# Patient Record
Sex: FEMALE | Race: WHITE | NOT HISPANIC OR LATINO | Employment: PART TIME | URBAN - METROPOLITAN AREA
[De-identification: names, ages, dates, MRNs, and addresses within clinical notes are randomized per-mention and may not be internally consistent; named-entity substitution may affect disease eponyms.]

---

## 2022-01-31 ENCOUNTER — OFFICE VISIT (OUTPATIENT)
Dept: OBSTETRICS AND GYNECOLOGY | Facility: CLINIC | Age: 37
End: 2022-01-31
Payer: MEDICAID

## 2022-01-31 VITALS
HEIGHT: 66 IN | WEIGHT: 172.63 LBS | DIASTOLIC BLOOD PRESSURE: 90 MMHG | SYSTOLIC BLOOD PRESSURE: 182 MMHG | HEART RATE: 78 BPM | BODY MASS INDEX: 27.74 KG/M2

## 2022-01-31 DIAGNOSIS — Z01.419 ENCOUNTER FOR ANNUAL ROUTINE GYNECOLOGICAL EXAMINATION: Primary | ICD-10-CM

## 2022-01-31 DIAGNOSIS — Z80.3 FAMILY HISTORY OF BREAST CANCER IN FIRST DEGREE RELATIVE: ICD-10-CM

## 2022-01-31 DIAGNOSIS — I10 PRIMARY HYPERTENSION: ICD-10-CM

## 2022-01-31 DIAGNOSIS — N83.202 CYST OF LEFT OVARY: ICD-10-CM

## 2022-01-31 DIAGNOSIS — Z11.3 SCREEN FOR STD (SEXUALLY TRANSMITTED DISEASE): ICD-10-CM

## 2022-01-31 PROCEDURE — 3080F DIAST BP >= 90 MM HG: CPT | Mod: CPTII,S$GLB,, | Performed by: NURSE PRACTITIONER

## 2022-01-31 PROCEDURE — 1159F PR MEDICATION LIST DOCUMENTED IN MEDICAL RECORD: ICD-10-PCS | Mod: CPTII,S$GLB,, | Performed by: NURSE PRACTITIONER

## 2022-01-31 PROCEDURE — 3077F PR MOST RECENT SYSTOLIC BLOOD PRESSURE >= 140 MM HG: ICD-10-PCS | Mod: CPTII,S$GLB,, | Performed by: NURSE PRACTITIONER

## 2022-01-31 PROCEDURE — 99385 PR PREVENTIVE VISIT,NEW,18-39: ICD-10-PCS | Mod: S$GLB,,, | Performed by: NURSE PRACTITIONER

## 2022-01-31 PROCEDURE — 3008F BODY MASS INDEX DOCD: CPT | Mod: CPTII,S$GLB,, | Performed by: NURSE PRACTITIONER

## 2022-01-31 PROCEDURE — 99385 PREV VISIT NEW AGE 18-39: CPT | Mod: S$GLB,,, | Performed by: NURSE PRACTITIONER

## 2022-01-31 PROCEDURE — 1160F RVW MEDS BY RX/DR IN RCRD: CPT | Mod: CPTII,S$GLB,, | Performed by: NURSE PRACTITIONER

## 2022-01-31 PROCEDURE — 3080F PR MOST RECENT DIASTOLIC BLOOD PRESSURE >= 90 MM HG: ICD-10-PCS | Mod: CPTII,S$GLB,, | Performed by: NURSE PRACTITIONER

## 2022-01-31 PROCEDURE — 3008F PR BODY MASS INDEX (BMI) DOCUMENTED: ICD-10-PCS | Mod: CPTII,S$GLB,, | Performed by: NURSE PRACTITIONER

## 2022-01-31 PROCEDURE — 1160F PR REVIEW ALL MEDS BY PRESCRIBER/CLIN PHARMACIST DOCUMENTED: ICD-10-PCS | Mod: CPTII,S$GLB,, | Performed by: NURSE PRACTITIONER

## 2022-01-31 PROCEDURE — 3077F SYST BP >= 140 MM HG: CPT | Mod: CPTII,S$GLB,, | Performed by: NURSE PRACTITIONER

## 2022-01-31 PROCEDURE — 1159F MED LIST DOCD IN RCRD: CPT | Mod: CPTII,S$GLB,, | Performed by: NURSE PRACTITIONER

## 2022-01-31 RX ORDER — METOPROLOL TARTRATE 25 MG/1
25 TABLET, FILM COATED ORAL NIGHTLY
Qty: 30 TABLET | Refills: 3 | Status: SHIPPED | OUTPATIENT
Start: 2022-01-31 | End: 2022-05-17

## 2022-01-31 NOTE — PROGRESS NOTES
"  Subjective:       Patient ID: Johanna Hu is a 36 y.o. female.    Chief Complaint:  Well Woman      History of Present Illness  Pt here for gyn annual.  History and past labs reviewed with patient.    Complaints: HX of abnormal PAP in past, HX of pelvic mass that resolved, cycles WNL.  Family HX of breast cancer: mother DX in late 50's and had mastec, MGM with same, also Mat Aunt DX in her 40's       Review of Systems  Review of Systems   Constitutional: Negative for appetite change and fatigue.   HENT: Negative for tinnitus.    Cardiovascular: Negative for chest pain and palpitations.   Gastrointestinal: Negative for abdominal pain, bloating, constipation, vomiting and reflux.   Endocrine: Negative for diabetes, hyperthyroidism and hypothyroidism.   Genitourinary: Negative for flank pain, menstrual problem, pelvic pain and postcoital bleeding.   Musculoskeletal: Negative for back pain and myalgias.   Integumentary:  Negative for rash, mole/lesion, breast mass and breast tenderness.   Neurological: Negative for vertigo, syncope and headaches.   Psychiatric/Behavioral: Negative for depression and sleep disturbance. The patient is not nervous/anxious.    Breast: Negative for lump, mass, mastodynia and tenderness          Objective:     Vitals:    01/31/22 1052   BP: (!) 182/90   Pulse: 78   Weight: 78.3 kg (172 lb 9.6 oz)   Height: 5' 6" (1.676 m)        Physical Exam:   Constitutional: She is oriented to person, place, and time. She appears well-developed and well-nourished.    HENT:   Head: Normocephalic and atraumatic.    Eyes: Pupils are equal, round, and reactive to light. Conjunctivae are normal.    Neck: No thyromegaly present.    Cardiovascular: Normal rate, regular rhythm and normal heart sounds.  Exam reveals no clubbing, no cyanosis and no edema.     Pulmonary/Chest: Effort normal and breath sounds normal.        Abdominal: Soft. She exhibits no distension. Hernia confirmed negative in the right inguinal " area and confirmed negative in the left inguinal area.     Genitourinary:    Vagina and uterus normal.      Pelvic exam was performed with patient supine.   There is no rash, tenderness, lesion or injury on the right labia. There is no rash, tenderness, lesion or injury on the left labia. Cervix is normal. Right adnexum displays no mass, no tenderness and no fullness. Left adnexum displays no mass, no tenderness and no fullness. Cervix exhibits no discharge.    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      Lymphadenopathy:        Right: No inguinal adenopathy present.        Left: No inguinal adenopathy present.    Neurological: She is alert and oriented to person, place, and time.    Skin: Skin is warm and dry. No cyanosis. Nails show no clubbing.    Psychiatric: She has a normal mood and affect. Her behavior is normal.      breast exam wnl- no nipple dc, skin changes, masses or lymph nodes palpated bilaterally    Assessment:     1. Encounter for annual routine gynecological examination    2. Cyst of left ovary    3. Primary hypertension    4. Family history of breast cancer in first degree relative              Plan:       Encounter for annual routine gynecological examination  -     CBC Auto Differential; Future; Expected date: 01/31/2022  -     Comprehensive Metabolic Panel; Future; Expected date: 01/31/2022  -     T4, Free; Future; Expected date: 01/31/2022  -     TSH; Future; Expected date: 01/31/2022  -     Urinalysis, Reflex to Urine Culture Urine, Clean Catch; Future; Expected date: 01/31/2022  -     Lipid Panel; Future; Expected date: 01/31/2022    Cyst of left ovary    Primary hypertension  -     metoprolol tartrate (LOPRESSOR) 25 MG tablet; Take 1 tablet (25 mg total) by mouth every evening.  Dispense: 30 tablet; Refill: 3  -     CBC Auto Differential; Future; Expected date:  01/31/2022  -     Comprehensive Metabolic Panel; Future; Expected date: 01/31/2022  -     T4, Free; Future; Expected date: 01/31/2022  -     TSH; Future; Expected date: 01/31/2022  -     Urinalysis, Reflex to Urine Culture Urine, Clean Catch; Future; Expected date: 01/31/2022  -     Lipid Panel; Future; Expected date: 01/31/2022    Family history of breast cancer in first degree relative     FU soon with PCP for BP evaluation  Nurse navigator  Obtain CT results done with Dr. Margret terry and Dr. Shelby.     Follow up in about 3 months (around 4/30/2022), or if symptoms worsen or fail to improve, for BP check.

## 2022-02-02 LAB
CHLAMYDIA: NEGATIVE
GONORRHEA: NEGATIVE
SOURCE: NORMAL
SOURCE: NORMAL
TRICHOMONAS AMPLIFIED: NEGATIVE

## 2022-05-17 ENCOUNTER — OFFICE VISIT (OUTPATIENT)
Dept: OBSTETRICS AND GYNECOLOGY | Facility: CLINIC | Age: 37
End: 2022-05-17
Payer: MEDICAID

## 2022-05-17 VITALS
WEIGHT: 170.63 LBS | BODY MASS INDEX: 27.42 KG/M2 | SYSTOLIC BLOOD PRESSURE: 145 MMHG | HEIGHT: 66 IN | HEART RATE: 61 BPM | DIASTOLIC BLOOD PRESSURE: 86 MMHG

## 2022-05-17 DIAGNOSIS — F41.9 ANXIETY: Primary | ICD-10-CM

## 2022-05-17 DIAGNOSIS — G43.009 MIGRAINE WITHOUT AURA AND WITHOUT STATUS MIGRAINOSUS, NOT INTRACTABLE: ICD-10-CM

## 2022-05-17 PROCEDURE — 1159F PR MEDICATION LIST DOCUMENTED IN MEDICAL RECORD: ICD-10-PCS | Mod: CPTII,S$GLB,, | Performed by: NURSE PRACTITIONER

## 2022-05-17 PROCEDURE — 3008F PR BODY MASS INDEX (BMI) DOCUMENTED: ICD-10-PCS | Mod: CPTII,S$GLB,, | Performed by: NURSE PRACTITIONER

## 2022-05-17 PROCEDURE — 1159F MED LIST DOCD IN RCRD: CPT | Mod: CPTII,S$GLB,, | Performed by: NURSE PRACTITIONER

## 2022-05-17 PROCEDURE — 3079F DIAST BP 80-89 MM HG: CPT | Mod: CPTII,S$GLB,, | Performed by: NURSE PRACTITIONER

## 2022-05-17 PROCEDURE — 99213 OFFICE O/P EST LOW 20 MIN: CPT | Mod: S$GLB,,, | Performed by: NURSE PRACTITIONER

## 2022-05-17 PROCEDURE — 99213 PR OFFICE/OUTPT VISIT, EST, LEVL III, 20-29 MIN: ICD-10-PCS | Mod: S$GLB,,, | Performed by: NURSE PRACTITIONER

## 2022-05-17 PROCEDURE — 3008F BODY MASS INDEX DOCD: CPT | Mod: CPTII,S$GLB,, | Performed by: NURSE PRACTITIONER

## 2022-05-17 PROCEDURE — 3077F SYST BP >= 140 MM HG: CPT | Mod: CPTII,S$GLB,, | Performed by: NURSE PRACTITIONER

## 2022-05-17 PROCEDURE — 3077F PR MOST RECENT SYSTOLIC BLOOD PRESSURE >= 140 MM HG: ICD-10-PCS | Mod: CPTII,S$GLB,, | Performed by: NURSE PRACTITIONER

## 2022-05-17 PROCEDURE — 1160F PR REVIEW ALL MEDS BY PRESCRIBER/CLIN PHARMACIST DOCUMENTED: ICD-10-PCS | Mod: CPTII,S$GLB,, | Performed by: NURSE PRACTITIONER

## 2022-05-17 PROCEDURE — 1160F RVW MEDS BY RX/DR IN RCRD: CPT | Mod: CPTII,S$GLB,, | Performed by: NURSE PRACTITIONER

## 2022-05-17 PROCEDURE — 3079F PR MOST RECENT DIASTOLIC BLOOD PRESSURE 80-89 MM HG: ICD-10-PCS | Mod: CPTII,S$GLB,, | Performed by: NURSE PRACTITIONER

## 2022-05-17 RX ORDER — BUSPIRONE HYDROCHLORIDE 15 MG/1
TABLET ORAL
Qty: 30 TABLET | Refills: 5 | Status: SHIPPED | OUTPATIENT
Start: 2022-05-17

## 2022-05-17 NOTE — PROGRESS NOTES
"  Subjective:       Patient ID: Johanna Hu is a 36 y.o. female.    Chief Complaint:  Follow-up      History of Present Illness  Pt here for follow up on BP elevated due to anxiety, pt took BP at home and was WNL. New onset of migraines related to anxiety.   History and past labs reviewed with patient.          Review of Systems  Review of Systems   Genitourinary: Negative for dysuria, hot flashes, menstrual problem and urgency.   Neurological: Positive for headaches.   Psychiatric/Behavioral: The patient is nervous/anxious.            Objective:     Vitals:    05/17/22 0859   BP: (!) 145/86   Pulse: 61   Weight: 77.4 kg (170 lb 9.6 oz)   Height: 5' 6" (1.676 m)        Physical Exam:   Constitutional: She is oriented to person, place, and time.       Cardiovascular: Normal rate.     Pulmonary/Chest: Effort normal.                      Neurological: She is oriented to person, place, and time.     Psychiatric: She has a normal mood and affect. Her behavior is normal. Thought content normal.      Pt states her PCP had given her buspar and this helps.     Assessment:     1. Anxiety    2. Migraine without aura and without status migrainosus, not intractable              Plan:       Anxiety    Migraine without aura and without status migrainosus, not intractable     continue on the Buspar as RX per PCP  Continue to check BP   Keep FU with PCP as recommended.     Follow up in about 1 year (around 5/17/2023), or if symptoms worsen or fail to improve.     "

## 2022-09-12 ENCOUNTER — TELEPHONE (OUTPATIENT)
Dept: OBSTETRICS AND GYNECOLOGY | Facility: CLINIC | Age: 37
End: 2022-09-12
Payer: MEDICAID

## 2023-05-18 ENCOUNTER — OFFICE VISIT (OUTPATIENT)
Dept: OBSTETRICS AND GYNECOLOGY | Facility: CLINIC | Age: 38
End: 2023-05-18
Payer: MEDICAID

## 2023-05-18 VITALS
DIASTOLIC BLOOD PRESSURE: 89 MMHG | WEIGHT: 180 LBS | SYSTOLIC BLOOD PRESSURE: 135 MMHG | HEART RATE: 73 BPM | BODY MASS INDEX: 29.05 KG/M2

## 2023-05-18 DIAGNOSIS — Z12.4 SCREENING FOR MALIGNANT NEOPLASM OF THE CERVIX: Primary | ICD-10-CM

## 2023-05-18 PROCEDURE — 3079F DIAST BP 80-89 MM HG: CPT | Mod: CPTII,S$GLB,, | Performed by: STUDENT IN AN ORGANIZED HEALTH CARE EDUCATION/TRAINING PROGRAM

## 2023-05-18 PROCEDURE — 3008F BODY MASS INDEX DOCD: CPT | Mod: CPTII,S$GLB,, | Performed by: STUDENT IN AN ORGANIZED HEALTH CARE EDUCATION/TRAINING PROGRAM

## 2023-05-18 PROCEDURE — 1159F MED LIST DOCD IN RCRD: CPT | Mod: CPTII,S$GLB,, | Performed by: STUDENT IN AN ORGANIZED HEALTH CARE EDUCATION/TRAINING PROGRAM

## 2023-05-18 PROCEDURE — 1160F PR REVIEW ALL MEDS BY PRESCRIBER/CLIN PHARMACIST DOCUMENTED: ICD-10-PCS | Mod: CPTII,S$GLB,, | Performed by: STUDENT IN AN ORGANIZED HEALTH CARE EDUCATION/TRAINING PROGRAM

## 2023-05-18 PROCEDURE — 99214 PR OFFICE/OUTPT VISIT, EST, LEVL IV, 30-39 MIN: ICD-10-PCS | Mod: S$GLB,,, | Performed by: STUDENT IN AN ORGANIZED HEALTH CARE EDUCATION/TRAINING PROGRAM

## 2023-05-18 PROCEDURE — 3079F PR MOST RECENT DIASTOLIC BLOOD PRESSURE 80-89 MM HG: ICD-10-PCS | Mod: CPTII,S$GLB,, | Performed by: STUDENT IN AN ORGANIZED HEALTH CARE EDUCATION/TRAINING PROGRAM

## 2023-05-18 PROCEDURE — 3008F PR BODY MASS INDEX (BMI) DOCUMENTED: ICD-10-PCS | Mod: CPTII,S$GLB,, | Performed by: STUDENT IN AN ORGANIZED HEALTH CARE EDUCATION/TRAINING PROGRAM

## 2023-05-18 PROCEDURE — 3075F SYST BP GE 130 - 139MM HG: CPT | Mod: CPTII,S$GLB,, | Performed by: STUDENT IN AN ORGANIZED HEALTH CARE EDUCATION/TRAINING PROGRAM

## 2023-05-18 PROCEDURE — 1160F RVW MEDS BY RX/DR IN RCRD: CPT | Mod: CPTII,S$GLB,, | Performed by: STUDENT IN AN ORGANIZED HEALTH CARE EDUCATION/TRAINING PROGRAM

## 2023-05-18 PROCEDURE — 1159F PR MEDICATION LIST DOCUMENTED IN MEDICAL RECORD: ICD-10-PCS | Mod: CPTII,S$GLB,, | Performed by: STUDENT IN AN ORGANIZED HEALTH CARE EDUCATION/TRAINING PROGRAM

## 2023-05-18 PROCEDURE — 99214 OFFICE O/P EST MOD 30 MIN: CPT | Mod: S$GLB,,, | Performed by: STUDENT IN AN ORGANIZED HEALTH CARE EDUCATION/TRAINING PROGRAM

## 2023-05-18 PROCEDURE — 3075F PR MOST RECENT SYSTOLIC BLOOD PRESS GE 130-139MM HG: ICD-10-PCS | Mod: CPTII,S$GLB,, | Performed by: STUDENT IN AN ORGANIZED HEALTH CARE EDUCATION/TRAINING PROGRAM

## 2023-05-18 NOTE — PROGRESS NOTES
Chief Complaint: Annual Exam    HPI: 37 y.o.  here for Annual Exam.  Patient doing well today.  She reports abnormal uterine bleeding with heavy cycles.  Cycles occur monthly lasting 8 days with heavy bleeding.  She reports having a change add today.  Overnight she has to change a pad still has soiled sheets.  She is not currently on medication for cycle regulation she has had a tubal ligation for contraception.  She has no other complaints today.    Review of Systems   Constitutional:  Negative for chills and fever.   HENT:  Negative for congestion and sore throat.    Respiratory:  Negative for cough and shortness of breath.    Cardiovascular:  Negative for chest pain and palpitations.   Gastrointestinal:  Negative for abdominal pain, constipation, diarrhea, nausea and vomiting.   Genitourinary:  Negative for dysuria, flank pain, frequency, hematuria and urgency.   Musculoskeletal:  Negative for back pain.   Skin:  Negative for itching and rash.   Neurological:  Negative for headaches.   All other systems reviewed and are negative.    PMH: anxiety  PSH: BTL, D&C, cholecystectomy  Meds: buspar  NKDA  Obhx:   GYNhx: denies abnormal pap smears, denies STD's  Social hx: + tob and etoh use, denies drug use  Family hx: + breast cancer, denies colon, ovarian or uterine cancers.     Physical Exam:  Vitals:    23 0816   BP: 135/89   Pulse: 73     Body mass index is 29.05 kg/m².  Physical Exam  Vitals reviewed. Exam conducted with a chaperone present.   Constitutional:       General: She is not in acute distress.     Appearance: Normal appearance. She is normal weight.   HENT:      Head: Normocephalic and atraumatic.   Eyes:      Extraocular Movements: Extraocular movements intact.      Pupils: Pupils are equal, round, and reactive to light.   Pulmonary:      Effort: Pulmonary effort is normal. No respiratory distress.   Chest:   Breasts:     Breasts are symmetrical.      Right: No inverted nipple, mass,  nipple discharge, skin change or tenderness.      Left: No inverted nipple, mass, nipple discharge, skin change or tenderness.   Abdominal:      General: There is no distension.      Palpations: Abdomen is soft.      Tenderness: There is no abdominal tenderness.   Genitourinary:     Comments: Normal external female genitalia, no masses or lesions, vagina pink with rugated, no vaginal bleeding, normal physiologic discharge, not friable cervix  Musculoskeletal:         General: No swelling or tenderness. Normal range of motion.      Cervical back: Normal range of motion and neck supple.   Lymphadenopathy:      Upper Body:      Right upper body: No supraclavicular or axillary adenopathy.      Left upper body: No supraclavicular or axillary adenopathy.   Skin:     General: Skin is warm and dry.   Neurological:      General: No focal deficit present.      Mental Status: She is alert and oriented to person, place, and time.        ASSESSMENT:   Patient is a 37 y.o.  who presents for annual exam     Patient Active Problem List   Diagnosis    Cyst of left ovary    Family history of breast cancer in first degree relative    Primary hypertension       PLAN:  Pap today  Pelvic US ordered   Discussed medical and surgical options for treatment of her AUB, pt desires surgery  Discussed endometrial ablation  Pre-conception counseling- folic acid and PNV  Counseling on self-breast exams, diet, and exercise.  RTC for results

## 2023-05-23 LAB — Lab: NORMAL

## 2023-06-27 ENCOUNTER — OFFICE VISIT (OUTPATIENT)
Dept: OBSTETRICS AND GYNECOLOGY | Facility: CLINIC | Age: 38
End: 2023-06-27
Payer: MEDICAID

## 2023-06-27 ENCOUNTER — PROCEDURE VISIT (OUTPATIENT)
Dept: OBSTETRICS AND GYNECOLOGY | Facility: CLINIC | Age: 38
End: 2023-06-27
Payer: MEDICAID

## 2023-06-27 VITALS
DIASTOLIC BLOOD PRESSURE: 86 MMHG | BODY MASS INDEX: 29.7 KG/M2 | HEART RATE: 76 BPM | WEIGHT: 184 LBS | SYSTOLIC BLOOD PRESSURE: 153 MMHG

## 2023-06-27 DIAGNOSIS — N83.202 CYST OF LEFT OVARY: ICD-10-CM

## 2023-06-27 DIAGNOSIS — N93.9 ABNORMAL UTERINE BLEEDING (AUB): Primary | ICD-10-CM

## 2023-06-27 DIAGNOSIS — Z71.2 ENCOUNTER TO DISCUSS TEST RESULTS: ICD-10-CM

## 2023-06-27 DIAGNOSIS — N83.202 CYST OF LEFT OVARY: Primary | ICD-10-CM

## 2023-06-27 PROCEDURE — 3077F PR MOST RECENT SYSTOLIC BLOOD PRESSURE >= 140 MM HG: ICD-10-PCS | Mod: CPTII,S$GLB,, | Performed by: STUDENT IN AN ORGANIZED HEALTH CARE EDUCATION/TRAINING PROGRAM

## 2023-06-27 PROCEDURE — 1160F RVW MEDS BY RX/DR IN RCRD: CPT | Mod: CPTII,S$GLB,, | Performed by: STUDENT IN AN ORGANIZED HEALTH CARE EDUCATION/TRAINING PROGRAM

## 2023-06-27 PROCEDURE — 76830 TRANSVAGINAL US NON-OB: CPT | Mod: S$GLB,,, | Performed by: STUDENT IN AN ORGANIZED HEALTH CARE EDUCATION/TRAINING PROGRAM

## 2023-06-27 PROCEDURE — 1160F PR REVIEW ALL MEDS BY PRESCRIBER/CLIN PHARMACIST DOCUMENTED: ICD-10-PCS | Mod: CPTII,S$GLB,, | Performed by: STUDENT IN AN ORGANIZED HEALTH CARE EDUCATION/TRAINING PROGRAM

## 2023-06-27 PROCEDURE — 1159F MED LIST DOCD IN RCRD: CPT | Mod: CPTII,S$GLB,, | Performed by: STUDENT IN AN ORGANIZED HEALTH CARE EDUCATION/TRAINING PROGRAM

## 2023-06-27 PROCEDURE — 99213 OFFICE O/P EST LOW 20 MIN: CPT | Mod: 25,S$GLB,, | Performed by: STUDENT IN AN ORGANIZED HEALTH CARE EDUCATION/TRAINING PROGRAM

## 2023-06-27 PROCEDURE — 3079F PR MOST RECENT DIASTOLIC BLOOD PRESSURE 80-89 MM HG: ICD-10-PCS | Mod: CPTII,S$GLB,, | Performed by: STUDENT IN AN ORGANIZED HEALTH CARE EDUCATION/TRAINING PROGRAM

## 2023-06-27 PROCEDURE — 99213 PR OFFICE/OUTPT VISIT, EST, LEVL III, 20-29 MIN: ICD-10-PCS | Mod: 25,S$GLB,, | Performed by: STUDENT IN AN ORGANIZED HEALTH CARE EDUCATION/TRAINING PROGRAM

## 2023-06-27 PROCEDURE — 3008F BODY MASS INDEX DOCD: CPT | Mod: CPTII,S$GLB,, | Performed by: STUDENT IN AN ORGANIZED HEALTH CARE EDUCATION/TRAINING PROGRAM

## 2023-06-27 PROCEDURE — 1159F PR MEDICATION LIST DOCUMENTED IN MEDICAL RECORD: ICD-10-PCS | Mod: CPTII,S$GLB,, | Performed by: STUDENT IN AN ORGANIZED HEALTH CARE EDUCATION/TRAINING PROGRAM

## 2023-06-27 PROCEDURE — 76830 US OB/GYN PROCEDURE (VIEWPOINT): ICD-10-PCS | Mod: S$GLB,,, | Performed by: STUDENT IN AN ORGANIZED HEALTH CARE EDUCATION/TRAINING PROGRAM

## 2023-06-27 PROCEDURE — 3079F DIAST BP 80-89 MM HG: CPT | Mod: CPTII,S$GLB,, | Performed by: STUDENT IN AN ORGANIZED HEALTH CARE EDUCATION/TRAINING PROGRAM

## 2023-06-27 PROCEDURE — 3008F PR BODY MASS INDEX (BMI) DOCUMENTED: ICD-10-PCS | Mod: CPTII,S$GLB,, | Performed by: STUDENT IN AN ORGANIZED HEALTH CARE EDUCATION/TRAINING PROGRAM

## 2023-06-27 PROCEDURE — 3077F SYST BP >= 140 MM HG: CPT | Mod: CPTII,S$GLB,, | Performed by: STUDENT IN AN ORGANIZED HEALTH CARE EDUCATION/TRAINING PROGRAM

## 2023-06-27 NOTE — PROGRESS NOTES
Chief Complaint: AUB    HPI: Patient is a 37 y.o. y.o. female  who presents to GYN clinic for follow up.  Patient doing well today.  She is currently on her cycle.  Cycles are still heavy.  Patient had ultrasound today.  She has no other complaints at this time.  Review of systems negative unless mentioned above.    Physical Exam:  Vitals:    23 1451   BP: (!) 153/86   Pulse: 76   Weight: 83.5 kg (184 lb)   Body mass index is 29.7 kg/m².    Gen: NAD, well developed, well nourished  Psych: alert and oriented x 3, normal affect  HEENT: normocephalic, atraumatic  Abd: soft, non-tender, non-distended  Skin: warm and dry  Ext: no c/c/c, moves all extremities  Neurological: normal gait, gross motor function intact    Results:  Pelvic US:  Uterus   ======   Uterus length 84 mm   Uterus width 47 mm   Uterus height 47 mm   Uterus Vol 96.5 cmÂ³   Endometrial thickness, total 2.3 mm     Right Ovary   =========   Rt ovary D1 25 mm   Rt ovary D2 19 mm   Rt ovary D3 20 mm   Rt ovary Vol 5.0 cmÂ³     Left Ovary   ========   Lt ovary D1 31 mm   Lt ovary D2 27 mm   Lt ovary D3 24 mm   Lt ovary Vol 10.1 cmÂ³     Impression   =========   1.7 x 1.1cm complex area in ut fundus   lana ovaries appear wnl                                         Assessment: Patient is a 37 y.o. y.o. female  with  Patient Active Problem List   Diagnosis    Cyst of left ovary    Family history of breast cancer in first degree relative    Primary hypertension       Plan:  Reviewed ultrasound results with patient today, patient voices understanding   We discussed different treatment options for her menorrhagia including endometrial ablation  Patient take about procedure   Will notify clinic if her decision   Return to clinic p.r.n.    Nabil Jennings MD

## 2023-11-28 ENCOUNTER — OFFICE VISIT (OUTPATIENT)
Dept: OBSTETRICS AND GYNECOLOGY | Facility: CLINIC | Age: 38
End: 2023-11-28
Payer: MEDICAID

## 2023-11-28 VITALS
HEART RATE: 68 BPM | BODY MASS INDEX: 29.25 KG/M2 | DIASTOLIC BLOOD PRESSURE: 102 MMHG | SYSTOLIC BLOOD PRESSURE: 160 MMHG | WEIGHT: 182 LBS | HEIGHT: 66 IN

## 2023-11-28 DIAGNOSIS — N94.0 OVULATION PAIN: Primary | ICD-10-CM

## 2023-11-28 PROCEDURE — 3080F PR MOST RECENT DIASTOLIC BLOOD PRESSURE >= 90 MM HG: ICD-10-PCS | Mod: CPTII,S$GLB,, | Performed by: OBSTETRICS & GYNECOLOGY

## 2023-11-28 PROCEDURE — 3008F PR BODY MASS INDEX (BMI) DOCUMENTED: ICD-10-PCS | Mod: CPTII,S$GLB,, | Performed by: OBSTETRICS & GYNECOLOGY

## 2023-11-28 PROCEDURE — 1159F PR MEDICATION LIST DOCUMENTED IN MEDICAL RECORD: ICD-10-PCS | Mod: CPTII,S$GLB,, | Performed by: OBSTETRICS & GYNECOLOGY

## 2023-11-28 PROCEDURE — 99213 OFFICE O/P EST LOW 20 MIN: CPT | Mod: S$GLB,,, | Performed by: OBSTETRICS & GYNECOLOGY

## 2023-11-28 PROCEDURE — 3080F DIAST BP >= 90 MM HG: CPT | Mod: CPTII,S$GLB,, | Performed by: OBSTETRICS & GYNECOLOGY

## 2023-11-28 PROCEDURE — 1159F MED LIST DOCD IN RCRD: CPT | Mod: CPTII,S$GLB,, | Performed by: OBSTETRICS & GYNECOLOGY

## 2023-11-28 PROCEDURE — 3077F PR MOST RECENT SYSTOLIC BLOOD PRESSURE >= 140 MM HG: ICD-10-PCS | Mod: CPTII,S$GLB,, | Performed by: OBSTETRICS & GYNECOLOGY

## 2023-11-28 PROCEDURE — 99213 PR OFFICE/OUTPT VISIT, EST, LEVL III, 20-29 MIN: ICD-10-PCS | Mod: S$GLB,,, | Performed by: OBSTETRICS & GYNECOLOGY

## 2023-11-28 PROCEDURE — 3077F SYST BP >= 140 MM HG: CPT | Mod: CPTII,S$GLB,, | Performed by: OBSTETRICS & GYNECOLOGY

## 2023-11-28 PROCEDURE — 3008F BODY MASS INDEX DOCD: CPT | Mod: CPTII,S$GLB,, | Performed by: OBSTETRICS & GYNECOLOGY

## 2023-11-28 NOTE — PROGRESS NOTES
Patient had little bit of pain in her back and on the right side and then check she had a thick mucus discharge in tiny bit of blood tinge in seem to be around day 12 of her cycle on examination today she has a very watery stretchy mucus very fertile looking mucus cervix is open consistent with ovulation her high estrogen levels bimanual is normal I told her she was probably ovulating and will just watch and see what happens has a pelvic exam is normal.  Her blood pressures little bit high today she said it is usually runs high what she comes to the doctor was normal at home and she will continue monitoring at home    Answers submitted by the patient for this visit:  Urinary Symptoms Questionnaire  (Submitted on 11/27/2023)  Chief Complaint: Urinary tract infection  Chronicity: new  Onset: yesterday  Frequency: intermittently  Progression since onset: unchanged  Pain - numeric: 5/10  Severity of pain: mild  Sexually active?: Yes  History of pyelonephritis?: No  chills: No  discharge: Yes  flank pain: Yes  frequency: Yes  hematuria: No  hesitancy: No  nausea: No  possible pregnancy: No  sweats: No  urgency: Yes  vomiting: No  constipation: No  rash: No  weight loss: No  withholding: No  behavior changes: No  Treatments tried: acetaminophen, sitz baths  Improvement on treatment: no relief  catheterization: No  diabetes insipidus: No  diabetes mellitus: No  genitourinary reflux: No  hypertension: Yes  recurrent UTIs: No  single kidney: No  STD: No  urinary stasis: No  urological procedure: No  kidney stones: No

## 2024-05-21 ENCOUNTER — OFFICE VISIT (OUTPATIENT)
Dept: OBSTETRICS AND GYNECOLOGY | Facility: CLINIC | Age: 39
End: 2024-05-21
Payer: MEDICAID

## 2024-05-21 VITALS
BODY MASS INDEX: 27.92 KG/M2 | HEART RATE: 79 BPM | SYSTOLIC BLOOD PRESSURE: 134 MMHG | DIASTOLIC BLOOD PRESSURE: 84 MMHG | WEIGHT: 173 LBS

## 2024-05-21 DIAGNOSIS — Z00.00 PHYSICAL EXAM, ANNUAL: Primary | ICD-10-CM

## 2024-05-21 PROCEDURE — 4010F ACE/ARB THERAPY RXD/TAKEN: CPT | Mod: CPTII,S$GLB,, | Performed by: OBSTETRICS & GYNECOLOGY

## 2024-05-21 PROCEDURE — 3008F BODY MASS INDEX DOCD: CPT | Mod: CPTII,S$GLB,, | Performed by: OBSTETRICS & GYNECOLOGY

## 2024-05-21 PROCEDURE — 99395 PREV VISIT EST AGE 18-39: CPT | Mod: S$GLB,,, | Performed by: OBSTETRICS & GYNECOLOGY

## 2024-05-21 PROCEDURE — 3079F DIAST BP 80-89 MM HG: CPT | Mod: CPTII,S$GLB,, | Performed by: OBSTETRICS & GYNECOLOGY

## 2024-05-21 PROCEDURE — 1159F MED LIST DOCD IN RCRD: CPT | Mod: CPTII,S$GLB,, | Performed by: OBSTETRICS & GYNECOLOGY

## 2024-05-21 PROCEDURE — 3075F SYST BP GE 130 - 139MM HG: CPT | Mod: CPTII,S$GLB,, | Performed by: OBSTETRICS & GYNECOLOGY

## 2024-05-21 NOTE — PROGRESS NOTES
Subjective     Patient ID: Johanna Hu is a 38 y.o. female.    Chief Complaint:  Annual Exam      History of Present Illness  S a 38-year-old female who has no gyn complaints she has 3 girls at home she feels good but tired at times          GYN & OB History  No LMP recorded (lmp unknown).   Date of Last Pap: No result found    OB History    Para Term  AB Living   5 3     2 3   SAB IAB Ectopic Multiple Live Births                  # Outcome Date GA Lbr Darell/2nd Weight Sex Type Anes PTL Lv   5 AB            4 AB            3 Para            2 Para            1 Para                Review of Systems  Review of Systems   Constitutional:  Negative for chills and fever.   Respiratory:  Negative for shortness of breath.    Cardiovascular:  Negative for chest pain.   Gastrointestinal:  Negative for abdominal pain, blood in stool, constipation, diarrhea, nausea, vomiting and reflux.   Genitourinary:  Negative for dysmenorrhea, dyspareunia, dysuria, hematuria, hot flashes, menorrhagia, menstrual problem, pelvic pain, vaginal bleeding, vaginal discharge, postcoital bleeding and vaginal dryness.   Musculoskeletal:  Negative for arthralgias and joint swelling.   Integumentary:  Negative for rash, hair changes, breast mass, nipple discharge and breast skin changes.   Psychiatric/Behavioral:  Negative for depression. The patient is not nervous/anxious.    Breast: Negative for asymmetry, lump, mass, nipple discharge and skin changes         Objective   Physical Exam:   Constitutional: She appears well-developed and well-nourished. No distress.    HENT:   Head: Normocephalic and atraumatic.    Eyes: Conjunctivae and EOM are normal.    Neck: No tracheal deviation present. No thyromegaly present.    Cardiovascular:       Exam reveals no clubbing, no cyanosis and no edema.        Pulmonary/Chest: Effort normal. No respiratory distress.        Abdominal: Soft. She exhibits no distension and no mass. There is no abdominal  tenderness. There is no rebound and no guarding. No hernia.     Genitourinary:    Vagina, uterus and rectum normal.      Pelvic exam was performed with patient supine.   There is no rash, tenderness, lesion or injury on the right labia. There is no rash, tenderness, lesion or injury on the left labia. Cervix is normal. Right adnexum displays no mass, no tenderness and no fullness. Left adnexum displays no mass, no tenderness and no fullness.                Skin: She is not diaphoretic. No cyanosis. Nails show no clubbing.             Assessment and Plan       Normal well-woman exam     Plan:  Follow-up 1 year

## 2024-05-28 DIAGNOSIS — R10.2 PELVIC PAIN: Primary | ICD-10-CM

## 2024-05-29 ENCOUNTER — PROCEDURE VISIT (OUTPATIENT)
Dept: OBSTETRICS AND GYNECOLOGY | Facility: CLINIC | Age: 39
End: 2024-05-29
Payer: MEDICAID

## 2024-05-29 ENCOUNTER — OFFICE VISIT (OUTPATIENT)
Dept: OBSTETRICS AND GYNECOLOGY | Facility: CLINIC | Age: 39
End: 2024-05-29
Payer: MEDICAID

## 2024-05-29 DIAGNOSIS — R10.2 PELVIC PAIN: ICD-10-CM

## 2024-05-29 DIAGNOSIS — N83.202 CYST OF LEFT OVARY: Primary | ICD-10-CM

## 2024-05-29 PROCEDURE — 4010F ACE/ARB THERAPY RXD/TAKEN: CPT | Mod: CPTII,S$GLB,, | Performed by: OBSTETRICS & GYNECOLOGY

## 2024-05-29 PROCEDURE — 76830 TRANSVAGINAL US NON-OB: CPT | Mod: S$GLB,,, | Performed by: OBSTETRICS & GYNECOLOGY

## 2024-05-29 PROCEDURE — 99213 OFFICE O/P EST LOW 20 MIN: CPT | Mod: S$GLB,,, | Performed by: OBSTETRICS & GYNECOLOGY

## 2024-05-29 NOTE — PROGRESS NOTES
Is a 38-year-old female here for follow-up pelvic pain she also gives a history of having sciatic type pain down the back of her left leg for years by see history she has had multiple ovarian cyst ultrasound shows a complex area of the left ovary 21 x 27 mm seems to be a resolving cyst explained to her I do not think into her left leg the sciatic type pain is related to this ovary I gave her several options on how to treat it she does not want birth control pills.  Therefore I will just give her a couple months if she still has the pain we ovulatory ultrasound of the area and I have recommend she see her family doc to possibly do an MRI of her spine see why she has sciatic pain    Answers submitted by the patient for this visit:  Gynecologic Exam Questionnaire  (Submitted on 5/29/2024)  Chief Complaint: Gynecologic exam  genital itching: No  genital lesions: No  genital odor: No  genital rash: No  missed menses: No  pelvic pain: No  vaginal bleeding: No  vaginal discharge: No

## 2025-06-19 ENCOUNTER — OFFICE VISIT (OUTPATIENT)
Dept: OBSTETRICS AND GYNECOLOGY | Facility: CLINIC | Age: 40
End: 2025-06-19
Payer: MEDICAID

## 2025-06-19 VITALS
DIASTOLIC BLOOD PRESSURE: 93 MMHG | HEART RATE: 79 BPM | WEIGHT: 167 LBS | BODY MASS INDEX: 26.95 KG/M2 | SYSTOLIC BLOOD PRESSURE: 156 MMHG

## 2025-06-19 DIAGNOSIS — Z11.3 SCREENING FOR STD (SEXUALLY TRANSMITTED DISEASE): ICD-10-CM

## 2025-06-19 DIAGNOSIS — Z87.42 HX OF ABNORMAL CERVICAL PAP SMEAR: Primary | ICD-10-CM

## 2025-06-19 NOTE — PROGRESS NOTES
Subjective     Patient ID: Johanna Hu is a 39 y.o. female.    Chief Complaint:  Well Woman      History of Present Illness  S a 39-year-old female here for annual examination she has no complaints she is doing well she has had an abnormal Pap in the past does have a first-degree relative with a breast cancer in his has a cyst on left ovary before.  She has had tubal ligation has a cigarette smoker      GYN & OB History  Patient's last menstrual period was 2025.   Date of Last Pap: 2023    OB History    Para Term  AB Living   5 3   2 3   SAB IAB Ectopic Multiple Live Births             # Outcome Date GA Lbr Darell/2nd Weight Sex Type Anes PTL Lv   5 AB            4 AB            3 Para            2 Para            1 Para                Review of Systems  Review of Systems   Constitutional:  Negative for chills and fever.   Respiratory:  Negative for shortness of breath.    Cardiovascular:  Negative for chest pain.   Gastrointestinal:  Negative for abdominal pain, blood in stool, constipation, diarrhea, nausea, vomiting and reflux.   Genitourinary:  Negative for dysmenorrhea, dyspareunia, dysuria, hematuria, hot flashes, menorrhagia, menstrual problem, pelvic pain, vaginal bleeding, vaginal discharge, postcoital bleeding and vaginal dryness.   Musculoskeletal:  Negative for arthralgias and joint swelling.   Integumentary:  Negative for rash, hair changes, breast mass, nipple discharge and breast skin changes.   Psychiatric/Behavioral:  Negative for depression. The patient is not nervous/anxious.    Breast: Negative for asymmetry, lump, mass, nipple discharge and skin changes       Objective   Physical Exam:   Constitutional: She appears well-developed and well-nourished. No distress.    HENT:   Head: Normocephalic and atraumatic.    Eyes: Conjunctivae and EOM are normal.    Neck: No tracheal deviation present. No thyromegaly present.    Cardiovascular:       Exam reveals no clubbing, no cyanosis  and no edema.        Pulmonary/Chest: Effort normal. No respiratory distress.        Abdominal: Soft. She exhibits no distension and no mass. There is no abdominal tenderness. There is no rebound and no guarding. No hernia.     Genitourinary:    Vagina, uterus and rectum normal.      Pelvic exam was performed with patient supine.   There is no rash, tenderness, lesion or injury on the right labia. There is no rash, tenderness, lesion or injury on the left labia. Cervix is normal. Right adnexum displays no mass, no tenderness and no fullness. Left adnexum displays no mass, no tenderness and no fullness.    Genitourinary Comments: Breast exam normal vulva vagina bimanual normal Pap smear was done                  Skin: She is not diaphoretic. No cyanosis. Nails show no clubbing.           Assessment and Plan     1. Hx of abnormal cervical Pap smear    2. Screening for STD (sexually transmitted disease)             Plan:  Pap done

## 2025-06-24 ENCOUNTER — RESULTS FOLLOW-UP (OUTPATIENT)
Dept: OBSTETRICS AND GYNECOLOGY | Facility: CLINIC | Age: 40
End: 2025-06-24